# Patient Record
Sex: FEMALE | Race: WHITE | NOT HISPANIC OR LATINO | URBAN - METROPOLITAN AREA
[De-identification: names, ages, dates, MRNs, and addresses within clinical notes are randomized per-mention and may not be internally consistent; named-entity substitution may affect disease eponyms.]

---

## 2017-12-05 ENCOUNTER — OFFICE VISIT - HEALTHEAST (OUTPATIENT)
Dept: CARDIOLOGY | Facility: CLINIC | Age: 57
End: 2017-12-05

## 2017-12-05 DIAGNOSIS — I49.3 SYMPTOMATIC PVCS: ICD-10-CM

## 2017-12-05 DIAGNOSIS — R06.00 DYSPNEA, UNSPECIFIED TYPE: ICD-10-CM

## 2017-12-05 DIAGNOSIS — R00.0 EXERCISE-INDUCED TACHYCARDIA: ICD-10-CM

## 2017-12-05 DIAGNOSIS — R00.2 PALPITATIONS: ICD-10-CM

## 2017-12-05 ASSESSMENT — MIFFLIN-ST. JEOR: SCORE: 1230.39

## 2017-12-18 ENCOUNTER — HOSPITAL ENCOUNTER (OUTPATIENT)
Dept: CARDIOLOGY | Facility: CLINIC | Age: 57
Discharge: HOME | End: 2017-12-18
Attending: INTERNAL MEDICINE

## 2017-12-18 DIAGNOSIS — R00.0 EXERCISE-INDUCED TACHYCARDIA: ICD-10-CM

## 2017-12-18 DIAGNOSIS — I49.3 SYMPTOMATIC PVCS: ICD-10-CM

## 2017-12-18 DIAGNOSIS — R06.00 DYSPNEA, UNSPECIFIED TYPE: ICD-10-CM

## 2017-12-18 DIAGNOSIS — R00.2 PALPITATIONS: ICD-10-CM

## 2017-12-18 LAB
CV STRESS CURRENT BP HE: NORMAL
CV STRESS CURRENT HR HE: 100
CV STRESS CURRENT HR HE: 108
CV STRESS CURRENT HR HE: 114
CV STRESS CURRENT HR HE: 130
CV STRESS CURRENT HR HE: 130
CV STRESS CURRENT HR HE: 132
CV STRESS CURRENT HR HE: 133
CV STRESS CURRENT HR HE: 137
CV STRESS CURRENT HR HE: 140
CV STRESS CURRENT HR HE: 140
CV STRESS CURRENT HR HE: 147
CV STRESS CURRENT HR HE: 160
CV STRESS CURRENT HR HE: 160
CV STRESS CURRENT HR HE: 162
CV STRESS CURRENT HR HE: 163
CV STRESS CURRENT HR HE: 90
CV STRESS CURRENT HR HE: 91
CV STRESS CURRENT HR HE: 91
CV STRESS CURRENT HR HE: 92
CV STRESS CURRENT HR HE: 92
CV STRESS CURRENT HR HE: 93
CV STRESS CURRENT HR HE: 93
CV STRESS CURRENT HR HE: 94
CV STRESS DEVIATION TIME HE: NORMAL
CV STRESS ECHO PERCENT HR HE: NORMAL
CV STRESS EXERCISE STAGE HE: NORMAL
CV STRESS FINAL RESTING BP HE: NORMAL
CV STRESS FINAL RESTING HR HE: 90
CV STRESS MAX HR HE: 165
CV STRESS MAX TREADMILL GRADE HE: 12
CV STRESS MAX TREADMILL SPEED HE: 2.5
CV STRESS PEAK DIA BP HE: NORMAL
CV STRESS PEAK SYS BP HE: NORMAL
CV STRESS PHASE HE: NORMAL
CV STRESS PROTOCOL HE: NORMAL
CV STRESS RESTING PT POSITION HE: NORMAL
CV STRESS RESTING PT POSITION HE: NORMAL
CV STRESS ST DEVIATION AMOUNT HE: NORMAL
CV STRESS ST DEVIATION ELEVATION HE: NORMAL
CV STRESS ST EVELATION AMOUNT HE: NORMAL
CV STRESS TEST TYPE HE: NORMAL
CV STRESS TOTAL STAGE TIME MIN 1 HE: NORMAL
ECHO EJECTION FRACTION ESTIMATED: 65 %
STRESS ECHO BASELINE BP: NORMAL
STRESS ECHO BASELINE HR: 98
STRESS ECHO CALCULATED PERCENT HR: 102 %
STRESS ECHO LAST STRESS BP: NORMAL
STRESS ECHO LAST STRESS HR: 162
STRESS ECHO POST ESTIMATED WORKLOAD: 7.1
STRESS ECHO POST EXERCISE DUR MIN: 5
STRESS ECHO POST EXERCISE DUR SEC: 59
STRESS ECHO TARGET HR: 138

## 2018-03-14 ENCOUNTER — COMMUNICATION - HEALTHEAST (OUTPATIENT)
Dept: ADMINISTRATIVE | Facility: CLINIC | Age: 58
End: 2018-03-14

## 2018-06-12 ENCOUNTER — COMMUNICATION - HEALTHEAST (OUTPATIENT)
Dept: TELEHEALTH | Facility: CLINIC | Age: 58
End: 2018-06-12

## 2018-06-12 ENCOUNTER — OFFICE VISIT - HEALTHEAST (OUTPATIENT)
Dept: CARDIOLOGY | Facility: CLINIC | Age: 58
End: 2018-06-12

## 2018-06-12 DIAGNOSIS — R00.2 PALPITATIONS: ICD-10-CM

## 2018-06-12 DIAGNOSIS — I49.3 PVC'S (PREMATURE VENTRICULAR CONTRACTIONS): ICD-10-CM

## 2018-06-12 DIAGNOSIS — R00.0 EXERCISE-INDUCED TACHYCARDIA: ICD-10-CM

## 2018-06-12 ASSESSMENT — MIFFLIN-ST. JEOR: SCORE: 1225.86

## 2021-05-31 VITALS — HEIGHT: 66 IN | BODY MASS INDEX: 22.98 KG/M2 | WEIGHT: 143 LBS

## 2021-06-01 VITALS — WEIGHT: 142 LBS | HEIGHT: 66 IN | BODY MASS INDEX: 22.82 KG/M2

## 2021-06-16 PROBLEM — R00.0 EXERCISE-INDUCED TACHYCARDIA: Status: ACTIVE | Noted: 2017-12-05

## 2021-06-16 PROBLEM — I49.3 PVC'S (PREMATURE VENTRICULAR CONTRACTIONS): Status: ACTIVE | Noted: 2017-12-05

## 2021-06-16 PROBLEM — R00.2 PALPITATIONS: Status: ACTIVE | Noted: 2017-12-05

## 2021-06-25 NOTE — PROGRESS NOTES
Progress Notes by Manuel Lott DO at 12/5/2017 10:30 AM     Author: Manuel Lott DO Service: -- Author Type: Physician    Filed: 12/5/2017  4:41 PM Encounter Date: 12/5/2017 Status: Signed    : Manuel Lott DO (Physician)           Click to link to [facility] HEART CARE NOTE    Thank you, Dr. Rizo, for asking the [facility] team to see Ms. Shima Patterson to evaluate palpitaitons.      Assessment/Recommendations   Assessment:    1. Rapid palpitations.  Occurred twice in past months a few minutes after exercise.  Assoicated with lightheadedness and chest pressure.     2. Premature ventricular contractions.    3. Mild hypokalemia    Plan:  1. Exercise stress echo to assess for exercise/ischemia induced arrhythmia and rule out structural heart disease.    2. If stress testing is negative and symptoms improve with correction of potassium would monitor.  If ongoing symptoms and negative stress testing would recommend 30 day cardiac monitor.   3.  Follow-up based on above testing.       History of Present Illness    Ms. Shima Patterson is a 57 y.o. female with no prior cardiac history who presents in cardiology clinic for rapid palpitations after exercise.    According to the patient on 2 occasions after exercising approximately 5 minutes post exercise she developed rapid palpitations which lasted for a few minutes.  On November 25 the patient developed a prolonged episode of rapid palpitations which she felt check weakness in her chest and a heart rate of approximately 200 bpm.  On presentation to the emergency department her symptoms had resolved.  She underwent evaluation with a 12-lead ECG which was reviewed which demonstrated sinus arrhythmia.  It was noted on telemetry that she had premature ventricular contractions with no sustained arrhythmias.  Troponin levels in the normal range.  D-dimer is in normal range.  She did have mild  hypokalemia with potassium level 3.2.  Since being discharged in the emergency department she has not exercised again.  She has also not experienced any further palpitation symptoms.      She does experience occasional sodas of a forceful beat which is likely ectopic beat.  She did have the same sensation when I was listening to her heart and she had one ectopic beat during listening.    Patient had episodes of frequent palpitations approximate 12 years ago and underwent cardiac evaluation.  Records are not available as they are from a different system.  She was told that time that she had a cardiac monitor that did not demonstrate sustained arrhythmia and was told to monitor symptoms.    Her mother had coronary artery disease.  She has no history of prior cardiac surgery.    ECG (personnaly reviewed): Sinus rhythm with sinus arrhythmia.  Heart rate 96 bpm.  No evidence of delta wave.  Normal QTC interval.  Normal QRS interval.    ECHO: No recent     Physical Examination Review of Systems   Vitals:    12/05/17 1037   BP: 120/70   Pulse: 64   Resp: 16     Body mass index is 23.08 kg/(m^2).  Wt Readings from Last 3 Encounters:   12/05/17 143 lb (64.9 kg)   11/25/17 145 lb (65.8 kg)       General Appearance:   no distress, normal body habitus   ENT/Mouth: membranes moist, no oral lesions or bleeding gums.      EYES:  no scleral icterus, normal conjunctivae   Neck: no carotid bruits or thyromegaly   Chest/Lungs:   lungs are clear to auscultation, no rales or wheezing, no  sternal scar, equal chest wall expansion    Cardiovascular:   Regular with single ectopic beat. Normal first and second heart sounds with no murmurs, rubs, or gallops; the carotid, radial and posterior tibial pulses are intact, Jugular venous pressure normal, no edema bilaterally    Abdomen:  no organomegaly, masses, bruits, or tenderness; bowel sounds are present   Extremities: no cyanosis or clubbing   Skin: no xanthelasma, warm.    Neurologic:  normal gait, normal  bilateral, no tremors     Psychiatric: alert and oriented x3, calm     General: WNL  Eyes: WNL  Ears/Nose/Throat: WNL  Lungs: WNL  Heart: WNL, Irregular Heartbeat     Bladder: WNL  Muscle/Joints: WNL  Skin: WNL  Nervous System: WNL  Mental Health: WNL     Blood: WNL     Medical History  Surgical History Family History Social History   Past Medical History:   Diagnosis Date   ? Migraine     No prior cardiac surgery or angiogram Family History   Problem Relation Age of Onset   ? Heart disease Mother    ? Coronary artery disease Mother     Social History     Social History   ? Marital status:      Spouse name: N/A   ? Number of children: N/A   ? Years of education: N/A     Occupational History   ? Not on file.     Social History Main Topics   ? Smoking status: Never Smoker   ? Smokeless tobacco: Never Used   ? Alcohol use No   ? Drug use: Not on file   ? Sexual activity: Yes     Partners: Male     Other Topics Concern   ? Not on file     Social History Narrative          Medications  Allergies   Current Outpatient Prescriptions   Medication Sig Dispense Refill   ? naproxen sodium (ALEVE) 220 MG tablet Take 220 mg by mouth every 12 (twelve) hours as needed for pain.       No current facility-administered medications for this visit.       No Known Allergies      Lab Results    Chemistry/lipid CBC Cardiac Enzymes/BNP/TSH/INR   Lab Results   Component Value Date    CREATININE 0.83 11/25/2017    BUN 13 11/25/2017    K 3.2 (L) 11/25/2017     11/25/2017     11/25/2017    CO2 24 11/25/2017    Lab Results   Component Value Date    WBC 4.9 11/25/2017    HGB 13.3 11/25/2017    HCT 38.9 11/25/2017    MCV 89 11/25/2017     11/25/2017    Lab Results   Component Value Date    TROPONINI <0.01 11/25/2017    TSH 2.38 11/25/2017

## 2021-06-26 NOTE — PROGRESS NOTES
Progress Notes by Manuel Lott DO at 2018  9:30 AM     Author: Manuel Lott DO Service: -- Author Type: Physician    Filed: 2018  9:46 AM Encounter Date: 2018 Status: Signed    : Manuel Lott DO (Physician)           Click to link to [facility] Care     [facility] HEART CARE NOTE    Assessment/Recommendations   Assessment:    1. Rapid palpitations, resolved.       2. Premature ventricular contractions, asymptomatic.   3. Exercise induced tachycardiac, stable.      Plan:  1.  Patient is noticed no ongoing episodes of prolonged palpitations after exercise.  She has instituted a warmup and cool down pattern to her exercise routine.  She continues to exercise frequently with a target heart rate between 140-150 bpm.  Not experienced any prolonged episodes of symptomatic palpitations after exercise since last follow-up.  2.  Continue daily exercise  3.  Would not recommend AV blocking agents such as metoprolol or calcium channel blockers.    4. Follow-up PRN.         History of Present Illness    Ms. Shima Patterson is a 57 y.o. female with no prior cardiac history who presents in cardiology clinic for follow-up of  rapid palpitations after exercise.    Since last follow-up the patient's had no recurrent episodes of rapid palpitations after exercise.  She has instituted a warmup and cool down pattern to her exercise routine.  She has decreased her maximal heart rate during exercise to 150 bpm.  Exercise stress testing was performed since last follow-up which demonstrated no sign of ischemia.  She did have mildly exaggerated heart rates with initiation of exercise.  She had no sustained arrhythmias.  Echocardiogram demonstrated normal left ventricular systolic function and no significant valvar heart disease (test was reviewed and discussed with the patient).       ECHO Exercise Stress Testin2017 (Reviewed)    The patient's exercise tolerance was mildly  impaired. No exercise-induced chest pain.    The stress electrocardiogram is negative for inducible ischemic EKG changes and arrhythmias.    Left ventricle ejection fraction is normal. The estimated left ventricular ejection fraction is 65%. No significant valvular heart abnormalities    Stress echocardiogram is negative for inducible ischemia.    Single PVC noted.    Heart increased to 162 bpm at 6 minutes.           Physical Examination Review of Systems   Vitals:    06/12/18 0923   BP: 112/60   Pulse: 76   Resp: 16     Body mass index is 22.92 kg/(m^2).  Wt Readings from Last 3 Encounters:   12/05/17 143 lb (64.9 kg)   11/25/17 145 lb (65.8 kg)       General Appearance:   no distress, normal body habitus   ENT/Mouth: membranes moist, no oral lesions or bleeding gums.      EYES:  no scleral icterus, normal conjunctivae   Neck: no carotid bruits or thyromegaly   Chest/Lungs:   lungs are clear to auscultation, no rales or wheezing, no  sternal scar, equal chest wall expansion    Cardiovascular:   Regular with single ectopic beat. Normal first and second heart sounds with no murmurs, rubs, or gallops; the carotid, radial and posterior tibial pulses are intact, Jugular venous pressure normal, no edema bilaterally    Abdomen:  no organomegaly, masses, bruits, or tenderness; bowel sounds are present   Extremities: no cyanosis or clubbing   Skin: no xanthelasma, warm.    Neurologic: normal gait, normal  bilateral, no tremors     Psychiatric: alert and oriented x3, calm     General: WNL  Eyes: WNL  Ears/Nose/Throat: WNL  Lungs: WNL  Heart: WNL  Stomach: WNL  Bladder: WNL  Muscle/Joints: WNL  Skin: WNL  Nervous System: WNL  Mental Health: WNL     Blood: WNL     Medical History  Surgical History Family History Social History   Past Medical History:   Diagnosis Date   ? Migraine     No prior cardiac surgery or angiogram.  Family History   Problem Relation Age of Onset   ? Heart disease Mother    ? Coronary artery disease  Mother     Social History     Social History   ? Marital status:      Spouse name: N/A   ? Number of children: N/A   ? Years of education: N/A     Occupational History   ? Not on file.     Social History Main Topics   ? Smoking status: Never Smoker   ? Smokeless tobacco: Never Used   ? Alcohol use No   ? Drug use: Not on file   ? Sexual activity: Yes     Partners: Male     Other Topics Concern   ? Not on file     Social History Narrative          Medications  Allergies   Current Outpatient Prescriptions   Medication Sig Dispense Refill   ? naproxen sodium (ALEVE) 220 MG tablet Take 220 mg by mouth every 12 (twelve) hours as needed for pain.       No current facility-administered medications for this visit.       No Known Allergies      Lab Results    Chemistry/lipid CBC Cardiac Enzymes/BNP/TSH/INR   Lab Results   Component Value Date    CREATININE 0.83 11/25/2017    BUN 13 11/25/2017    K 3.2 (L) 11/25/2017     11/25/2017     11/25/2017    CO2 24 11/25/2017    Lab Results   Component Value Date    WBC 4.9 11/25/2017    HGB 13.3 11/25/2017    HCT 38.9 11/25/2017    MCV 89 11/25/2017     11/25/2017    Lab Results   Component Value Date    TROPONINI <0.01 11/25/2017    TSH 2.38 11/25/2017

## 2021-07-14 PROBLEM — I47.10 SVT (SUPRAVENTRICULAR TACHYCARDIA) (H): Status: RESOLVED | Noted: 2017-12-05 | Resolved: 2017-12-05
